# Patient Record
Sex: FEMALE | Race: WHITE | NOT HISPANIC OR LATINO | Employment: UNEMPLOYED | URBAN - METROPOLITAN AREA
[De-identification: names, ages, dates, MRNs, and addresses within clinical notes are randomized per-mention and may not be internally consistent; named-entity substitution may affect disease eponyms.]

---
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
## 2025-01-23 ENCOUNTER — EVALUATION (OUTPATIENT)
Dept: PHYSICAL THERAPY | Facility: MEDICAL CENTER | Age: 20
End: 2025-01-23
Payer: COMMERCIAL

## 2025-01-23 DIAGNOSIS — M25.552 LEFT HIP PAIN: Primary | ICD-10-CM

## 2025-01-23 PROCEDURE — 97112 NEUROMUSCULAR REEDUCATION: CPT

## 2025-01-23 PROCEDURE — 97161 PT EVAL LOW COMPLEX 20 MIN: CPT

## 2025-01-23 NOTE — PROGRESS NOTES
PT Evaluation     Today's date: 2025  Patient name: Roberta Ceja  : 2005  MRN: [id number]  Referring provider: Evelyne Millan MD  Dx:   Encounter Diagnosis     ICD-10-CM    1. Left hip pain  M25.552 Ambulatory Referral to Physical Therapy                     Assessment  Impairments: abnormal muscle firing, abnormal muscle tone, abnormal or restricted ROM, abnormal movement, activity intolerance, impaired physical strength, lacks appropriate home exercise program and pain with function    Assessment details: Roberta Ceja  is a pleasant 19 y.o. female who presents with left hip pain.  The primary movement problem is poor muscular endurance resulting in poor motor coordination and strength deficit, which limit her ability to perform ADLs, IADLs and recreational activity.  No referral is necessary at this time based on examination results.   The patient's greatest concerns is not being able to stay active.     Problem List:  1) poor muscular endurance  2) poor motor coordination  3) strength deficit     Etiologic factors include nonuse.  Pt. will benefit from skilled PT services that includes manual therapy techniques to enhance tissue extensibility, neuromuscular re-education to facilitate motor control, therapeutic exercise to increase functional mobility, and modalities prn to reduce pain and inflammation.  Understanding of Dx/Px/POC: good     Prognosis: good  Prognosis details: Positive prognostic indicators: motivation to improve   Negative prognostic indicators: chronicity of symptoms    Goals  Impairment Goals  - Pt I with initial HEP in 1-2 visits  - Improve ROM equal to contralateral side in 4-6 weeks  - Increase strength to 5/5 in all affected areas in 4-6 weeks    Functional Goals  - Increase Functional Status Measure to: 65 in 6-8 weeks  - Patient will be independent with comprehensive HEP in 6-8 weeks  - Ambulation is improved to prior level of function in 6-8 weeks  - Stair climbing  is improved to prior level of function in 6-8 weeks  - Squatting is improved to prior level of function in 6-8 weeks      Plan  Patient would benefit from: skilled physical therapy  Planned modality interventions: low level laser therapy, TENS and cryotherapy    Planned therapy interventions: joint mobilization, manual therapy, massage, neuromuscular re-education, patient education, postural training, strengthening, stretching, therapeutic activities, therapeutic exercise, flexibility, functional ROM exercises, graded exercise, home exercise program, IASTM, kinesiology taping, Kline taping, balance and balance/weight bearing training    Treatment plan discussed with: patient  Plan details: Prognosis above is given PT services 2x/week tapering to 1x/week over the next 2 months and home program adherence.        Subjective Evaluation    History of Present Illness  Mechanism of injury: Roberta Ceja presents with c/c of L hip pain. Symptoms began September 2024 with mechanism of injury: insidious onset. Worse when she is on her period. Pt reports that it stays for about a week then it goes away. Pt reports that she will get a shooting pain into her knee everyone in awhile.   Aggravating factors: walking, standing  Relieving factors: laying down  24hr pain pattern: 0/10 (current), 0/10 (best), 7/10 (worst), location: lateral hip, descriptors: tightness/throbbing  Imaging: x-ray  Previous treatments: none  Occupation/recreation: , [employer] student   Primary concern: not knowing what's wrong  Patient goals: figure out what's going on, feel better          Objective     Palpation     Additional Palpation Details  No TTP    Tenderness     Additional Tenderness Details  No TTP    Active Range of Motion   Left Hip   Normal active range of motion    Right Hip   Normal active range of motion    Passive Range of Motion     Right Hip   Normal passive range of motion    Joint Play   Left Hip   Joints within functional  limits are the posterior hip capsule, anterior hip capsule, lateral hip capsule and long axis distraction.     Right Hip   Joints within functional limits are the posterior hip capsule, anterior hip capsule, long axis distraction and long axis distraction.     Strength/Myotome Testing     Left Hip   Planes of Motion   Flexion: 3+  Extension: 3+  Abduction: 3+  Adduction: 3+    Isolated Muscles   Gluteus poli: 3+  Gluteus medius: 3+    Right Hip   Planes of Motion   Flexion: 4-  Extension: 4-  Abduction: 4-  Adduction: 4-    Isolated Muscles   Gluteus maximums: 3+  Gluteus medius: 3+    Tests     Left Hip   Negative DALE, FADIR, long sit and Mauro.     Right Hip   Negative DALE, FADIR and long sit.              Precautions: anxiety    HEP: Access Code: [email or web address]  URL: [email or web address]  Date: 01/23/2025  Prepared by: Josy Fried-Nuria    Exercises  - Supine Active Straight Leg Raise  - 1 x daily - 3-4 x weekly - 2-3 sets - 10 reps  - Sidelying Hip Abduction  - 1 x daily - 3-4 x weekly - 2-3 sets - 10 reps  - Prone Hip Extension  - 1 x daily - 3-4 x weekly - 2-3 sets - 10 reps  - Sidelying Hip Adduction  - 1 x daily - 3-4 x weekly - 2-3 sets - 10 reps  - Supine Bridge  - 1 x daily - 3-4 x weekly - 2-3 sets - 10 reps  Manuals 1/23                                                                Neuro Re-Ed             Side steps              Monster walks                                                                 HEP review and pt education 25'            Ther Ex             bike             4 way SLR             bridges             clamshells             Reverse clamshells             Hip abd/ext                                                                              Ther Activity                                       Gait Training                                       Modalities

## 2025-01-31 ENCOUNTER — OFFICE VISIT (OUTPATIENT)
Dept: PHYSICAL THERAPY | Facility: MEDICAL CENTER | Age: 20
End: 2025-01-31
Payer: COMMERCIAL

## 2025-01-31 DIAGNOSIS — M25.552 LEFT HIP PAIN: Primary | ICD-10-CM

## 2025-01-31 PROCEDURE — 97110 THERAPEUTIC EXERCISES: CPT

## 2025-01-31 NOTE — PROGRESS NOTES
"Daily Note     Today's date: 2025  Patient name: Roberta Ceja  : 2005  MRN: [id number]  Referring provider: Evelyne Millan MD  Dx:   Encounter Diagnosis     ICD-10-CM    1. Left hip pain  M25.552                      Subjective: Pt reports that she feels fine.       Objective: See treatment diary below      Assessment: POC initiated. No adverse effects noted. Tolerated treatment well. Patient demonstrated fatigue post treatment, exhibited good technique with therapeutic exercises, and would benefit from continued PT      Plan: Continue per plan of care.      Precautions: anxiety    HEP:   Access Code: [email or web address]  URL: [email or web address]  Date: 2025  Prepared by: Josy Hui    Exercises  - Supine Active Straight Leg Raise  - 1 x daily - 3-4 x weekly - 2-3 sets - 10 reps  - Sidelying Hip Abduction  - 1 x daily - 3-4 x weekly - 2-3 sets - 10 reps  - Prone Hip Extension  - 1 x daily - 3-4 x weekly - 2-3 sets - 10 reps  - Sidelying Hip Adduction  - 1 x daily - 3-4 x weekly - 2-3 sets - 10 reps  - Supine Bridge  - 1 x daily - 3-4 x weekly - 2-3 sets - 10 reps  - Clamshell with Resistance  - 1 x daily - 7 x weekly - 3 sets - 10 reps  - Sidelying Reverse Clamshell  - 1 x daily - 7 x weekly - 3 sets - 10 reps  Manuals                                                                 Neuro Re-Ed             Side steps              Monster walks             Mini squats  2x15 10# to 36\" box                                                   HEP review and pt education             Ther Ex             bike 5'            4 way SLR 2x10            bridges 2x10  hip add  2x10 hip abd gtb            clamshells 2x10 rtb            Reverse clamshells 2x10            Hip abd/ext 2x10 rtb            LP 2x15 SL 30#                                                                Ther Activity                                       Gait Training                                     "   Modalities

## 2025-02-05 ENCOUNTER — APPOINTMENT (OUTPATIENT)
Dept: PHYSICAL THERAPY | Facility: MEDICAL CENTER | Age: 20
End: 2025-02-05
Payer: COMMERCIAL

## 2025-02-12 ENCOUNTER — OFFICE VISIT (OUTPATIENT)
Dept: PHYSICAL THERAPY | Facility: MEDICAL CENTER | Age: 20
End: 2025-02-12
Payer: COMMERCIAL

## 2025-02-12 DIAGNOSIS — M25.552 LEFT HIP PAIN: Primary | ICD-10-CM

## 2025-02-12 PROCEDURE — 97110 THERAPEUTIC EXERCISES: CPT

## 2025-02-12 NOTE — PROGRESS NOTES
"Daily Note     Today's date: 2025  Patient name: Roberta Ceja  : 2005  MRN: [id number]  Referring provider: Evelyne Millan MD  Dx:   Encounter Diagnosis     ICD-10-CM    1. Left hip pain  M25.552                      Subjective: Pt reports that she did not have any pain standing too long.       Objective: See treatment diary below      Assessment: Continued with Poc. Tolerated treatment well. Patient demonstrated fatigue post treatment, exhibited good technique with therapeutic exercises, and would benefit from continued PT      Plan: Continue per plan of care.      Precautions: anxiety    HEP:   Access Code: [email or web address]  URL: [email or web address]  Date: 2025  Prepared by: Josy Hui    Exercises  - Supine Active Straight Leg Raise  - 1 x daily - 3-4 x weekly - 2-3 sets - 10 reps  - Sidelying Hip Abduction  - 1 x daily - 3-4 x weekly - 2-3 sets - 10 reps  - Prone Hip Extension  - 1 x daily - 3-4 x weekly - 2-3 sets - 10 reps  - Sidelying Hip Adduction  - 1 x daily - 3-4 x weekly - 2-3 sets - 10 reps  - Supine Bridge  - 1 x daily - 3-4 x weekly - 2-3 sets - 10 reps  - Clamshell with Resistance  - 1 x daily - 7 x weekly - 3 sets - 10 reps  - Sidelying Reverse Clamshell  - 1 x daily - 7 x weekly - 3 sets - 10 reps  Manuals 2/12                                                                Neuro Re-Ed             Side steps              Monster walks             Mini squats  2x15 10# to 36\" box            Lateral tap down 6\" 2x10                                      HEP review and pt education             Ther Ex             bike 5' R3            4 way SLR 2x10            bridges 2x10  hip add  2x10 hip abd gtb            clamshells 2x10 rtb            Reverse clamshells 2x10            Hip abd/ext 2x10 rtb            LP 2x15 SL 30#                                                                Ther Activity                                       Gait Training               "                         Modalities

## 2025-02-26 ENCOUNTER — APPOINTMENT (OUTPATIENT)
Dept: PHYSICAL THERAPY | Facility: MEDICAL CENTER | Age: 20
End: 2025-02-26
Payer: COMMERCIAL

## 2025-03-05 ENCOUNTER — OFFICE VISIT (OUTPATIENT)
Dept: PHYSICAL THERAPY | Facility: MEDICAL CENTER | Age: 20
End: 2025-03-05
Payer: COMMERCIAL

## 2025-03-05 DIAGNOSIS — M25.552 LEFT HIP PAIN: Primary | ICD-10-CM

## 2025-03-05 PROCEDURE — 97110 THERAPEUTIC EXERCISES: CPT

## 2025-03-05 NOTE — PROGRESS NOTES
"Daily Note     Today's date: 3/5/2025  Patient name: Roberta Ceja  : 2005  MRN: [id number]  Referring provider: Evelyne Millan MD  Dx:   Encounter Diagnosis     ICD-10-CM    1. Left hip pain  M25.552                      Subjective: Pt reports she feels good. Reports no issues since last visit.      Objective: See treatment diary below      Assessment: Roberta eCja has been compliant with attending PT and home exercise program since initial eval.  Roberta  has made improvements in objective data since initial evalulation and has achieved all goals.  Patient reports having returned to their prior level or function. Patient provided with updated Home Exercise Program, all questions answered, verbalized understanding and agreement to plan of care. Thus it was mutually decided to discontinue this episode of care and transition to Home Exercise Program.      Plan: DC to HEP     Precautions: anxiety    HEP:   Access Code: [email or web address]  URL: [email or web address]  Date: 2025  Prepared by: Josy Hui    Exercises  - Supine Active Straight Leg Raise  - 1 x daily - 3-4 x weekly - 2-3 sets - 10 reps  - Sidelying Hip Abduction  - 1 x daily - 3-4 x weekly - 2-3 sets - 10 reps  - Prone Hip Extension  - 1 x daily - 3-4 x weekly - 2-3 sets - 10 reps  - Sidelying Hip Adduction  - 1 x daily - 3-4 x weekly - 2-3 sets - 10 reps  - Supine Bridge  - 1 x daily - 3-4 x weekly - 2-3 sets - 10 reps  - Clamshell with Resistance  - 1 x daily - 7 x weekly - 3 sets - 10 reps  - Sidelying Reverse Clamshell  - 1 x daily - 7 x weekly - 3 sets - 10 reps  Manuals 3/5                                                                Neuro Re-Ed             Side steps  3 laps gtb            Monster walks             Mini squats  2x15 10# to 36\" box            Lateral tap down 6\" 2x10                                      HEP review and pt education             Ther Ex             bike 10' R3            4 way SLR " 2x10            bridges 2x10  hip add  2x10 hip abd gtb            clamshells 2x10 rtb            Reverse clamshells 2x10            Hip abd/ext 2x10 rtb            LP 2x15 SL 30#                                                                Ther Activity                                       Gait Training                                       Modalities

## 2025-06-26 ENCOUNTER — OFFICE VISIT (OUTPATIENT)
Dept: FAMILY MEDICINE CLINIC | Facility: CLINIC | Age: 20
End: 2025-06-26
Payer: COMMERCIAL

## 2025-06-26 VITALS
WEIGHT: 110 LBS | TEMPERATURE: 97.5 F | HEART RATE: 104 BPM | HEIGHT: 65 IN | SYSTOLIC BLOOD PRESSURE: 110 MMHG | OXYGEN SATURATION: 98 % | BODY MASS INDEX: 18.33 KG/M2 | DIASTOLIC BLOOD PRESSURE: 70 MMHG

## 2025-06-26 DIAGNOSIS — R79.89 ABNORMAL TSH: ICD-10-CM

## 2025-06-26 DIAGNOSIS — F43.22 ADJUSTMENT DISORDER WITH ANXIOUS MOOD: ICD-10-CM

## 2025-06-26 DIAGNOSIS — F32.2 SEVERE MAJOR DEPRESSIVE DISORDER (HCC): ICD-10-CM

## 2025-06-26 DIAGNOSIS — F42.9 OBSESSIVE-COMPULSIVE DISORDER, UNSPECIFIED TYPE: ICD-10-CM

## 2025-06-26 DIAGNOSIS — Z00.00 ANNUAL PHYSICAL EXAM: Primary | ICD-10-CM

## 2025-06-26 DIAGNOSIS — E55.9 VITAMIN D DEFICIENCY: ICD-10-CM

## 2025-06-26 DIAGNOSIS — F41.9 ANXIETY: ICD-10-CM

## 2025-06-26 DIAGNOSIS — R63.6 UNDERWEIGHT: ICD-10-CM

## 2025-06-26 DIAGNOSIS — Z13.6 SCREENING FOR CARDIOVASCULAR CONDITION: ICD-10-CM

## 2025-06-26 DIAGNOSIS — Z86.2 HISTORY OF ANEMIA: ICD-10-CM

## 2025-06-26 DIAGNOSIS — F32.9 MAJOR DEPRESSIVE DISORDER, REMISSION STATUS UNSPECIFIED, UNSPECIFIED WHETHER RECURRENT: ICD-10-CM

## 2025-06-26 PROCEDURE — 99214 OFFICE O/P EST MOD 30 MIN: CPT | Performed by: FAMILY MEDICINE

## 2025-06-26 PROCEDURE — 99395 PREV VISIT EST AGE 18-39: CPT | Performed by: FAMILY MEDICINE

## 2025-06-26 RX ORDER — FLUOXETINE 10 MG/1
10 CAPSULE ORAL DAILY
Qty: 30 CAPSULE | Refills: 1 | Status: SHIPPED | OUTPATIENT
Start: 2025-06-26

## 2025-06-26 NOTE — PROGRESS NOTES
Answers submitted by the patient for this visit:  Annual Physical (Submitted on 2025)  Diet/Nutrition choices: no special diet  Exercise choices: walking  Exercise additional comments: walks my dog  Sleep choices: sleeps poorly  Hearing choices: normal hearing bilateral ears  Vision choices: wears glasses  Dental choices: regular dental visits, brushes teeth once daily, does not floss  Do you currently have an OB/GYN provider that you routinely follow with?: No  Menopausal status: premenopausal  Last menstrual cycle (if applicable):: 2025  Any history of sexual transmitted disease/infection?: No  Do you have an advance directive/living will?: No  Do you have a durable power of  (POA)?: No  Name: Roberta Ceja      : 2005      MRN: [id number]  Encounter Provider: Evelyne Millan MD  Encounter Date: 2025   Encounter department: [facility]  :  Assessment & Plan  Annual physical exam  Health maintenance reviewed  Labs ordered  Orders:    Lipid Panel with Direct LDL reflex; Future    CBC and differential; Future    Comprehensive metabolic panel; Future    TSH, 3rd generation with Free T4 reflex; Future    Vitamin D 25 hydroxy; Future    Abnormal TSH  Repeat TSH  Orders:    Lipid Panel with Direct LDL reflex; Future    CBC and differential; Future    Comprehensive metabolic panel; Future    TSH, 3rd generation with Free T4 reflex; Future    Vitamin D 25 hydroxy; Future    Major depressive disorder, remission status unspecified, unspecified whether recurrent  Depression Screening Follow-up Plan: Patient's depression screening was positive with a PHQ-9 score of 22. Patient assessed for underlying major depression. They have no active suicidal ideations. Brief counseling provided and recommend additional follow-up/re-evaluation next office visit. Start medication    Orders:    Lipid Panel with Direct LDL reflex; Future    CBC and differential; Future    Comprehensive  metabolic panel; Future    TSH, 3rd generation with Free T4 reflex; Future    Vitamin D 25 hydroxy; Future    Anxiety  Start Prozac  Counseling recommended  Orders:    Lipid Panel with Direct LDL reflex; Future    CBC and differential; Future    Comprehensive metabolic panel; Future    TSH, 3rd generation with Free T4 reflex; Future    Vitamin D 25 hydroxy; Future    FLUoxetine (PROzac) 10 mg capsule; Take 1 capsule (10 mg total) by mouth daily    Adjustment disorder with anxious mood  Start medication  Counseling recommended  Orders:    Lipid Panel with Direct LDL reflex; Future    CBC and differential; Future    Comprehensive metabolic panel; Future    TSH, 3rd generation with Free T4 reflex; Future    Vitamin D 25 hydroxy; Future    FLUoxetine (PROzac) 10 mg capsule; Take 1 capsule (10 mg total) by mouth daily    Screening for cardiovascular condition    Orders:    Lipid Panel with Direct LDL reflex; Future    CBC and differential; Future    Comprehensive metabolic panel; Future    TSH, 3rd generation with Free T4 reflex; Future    Vitamin D 25 hydroxy; Future    Vitamin D deficiency    Orders:    Lipid Panel with Direct LDL reflex; Future    CBC and differential; Future    Comprehensive metabolic panel; Future    TSH, 3rd generation with Free T4 reflex; Future    Vitamin D 25 hydroxy; Future    Underweight  Discussed diet and increasing caloric intake  Orders:    Lipid Panel with Direct LDL reflex; Future    CBC and differential; Future    Comprehensive metabolic panel; Future    TSH, 3rd generation with Free T4 reflex; Future    Vitamin D 25 hydroxy; Future    Iron Panel (Includes Ferritin, Iron Sat%, Iron, and TIBC); Future    History of anemia  Recheck iron panel  Orders:    Iron Panel (Includes Ferritin, Iron Sat%, Iron, and TIBC); Future    Severe major depressive disorder (HCC)      Orders:    FLUoxetine (PROzac) 10 mg capsule; Take 1 capsule (10 mg total) by mouth daily    Obsessive-compulsive disorder,  unspecified type    Orders:    FLUoxetine (PROzac) 10 mg capsule; Take 1 capsule (10 mg total) by mouth daily          Depression Screening and Follow-up Plan:   Yes      History of Present Illness   HPI  20yof here for well exam  Pt is concerned about depression and anxiety, OCD  Has been on meds in the past, is interested in getting back on meds  Pt denies thoughts of hurting self  Pt is interested in getting into counseling which I highly recommended  I did give her handout on counselors in the area, also recommended to call the mental health line on back on insurance card\  Pt feels her OCD is what triggers her anxiety  Pt also feels like she has some ADHD- has been on Tenex and Concerta in the past  Discussed not starting more than 1 med at a time to see reaction and side effects  Discussed starting with Prozac since pt feels OCD is what triggers most of her symptoms  Also discussed getting psych consult possibly for evaluation  Pt and mom are in agreement with this plan  Pt is currently in school, denies smoking, drugs  History of anemia, will check iron panel  Health maintenance reviewed  Denies bowel or bladder issues  Decline vaccines        PHQ-2/9 Depression Screening    Little interest or pleasure in doing things: 3 - nearly every day  Feeling down, depressed, or hopeless: 3 - nearly every day  Trouble falling or staying asleep, or sleeping too much: 3 - nearly every day  Feeling tired or having little energy: 3 - nearly every day  Poor appetite or overeating: 3 - nearly every day  Feeling bad about yourself - or that you are a failure or have let yourself or your family down: 3 - nearly every day  Trouble concentrating on things, such as reading the newspaper or watching television: 3 - nearly every day  Moving or speaking so slowly that other people could have noticed. Or the opposite - being so fidgety or restless that you have been moving around a lot more than usual: 0 - not at all  Thoughts that  "you would be better off dead, or of hurting yourself in some way: 1 - several days  PHQ-9 Score: 22  PHQ-9 Interpretation: Severe depression         Review of Systems   Constitutional: Negative.  Negative for chills and fever.   HENT: Negative.  Negative for ear pain and sore throat.    Eyes:  Negative for pain and visual disturbance.   Respiratory: Negative.  Negative for cough and shortness of breath.    Cardiovascular: Negative.  Negative for chest pain and palpitations.   Gastrointestinal: Negative.  Negative for abdominal pain and vomiting.   Genitourinary: Negative.  Negative for dysuria and hematuria.   Musculoskeletal:  Negative for arthralgias and back pain.   Skin:  Negative for color change and rash.   Neurological: Negative.  Negative for seizures and syncope.   Psychiatric/Behavioral:  Positive for decreased concentration and dysphoric mood. The patient is nervous/anxious.    All other systems reviewed and are negative.      Objective   /70 (BP Location: Left arm, Patient Position: Sitting, Cuff Size: Adult)   Pulse 104   Temp 97.5 °F (36.4 °C) (Temporal)   Ht 5' 5\" (1.651 m)   Wt 49.9 kg (110 lb)   LMP 06/14/2025   SpO2 98%   BMI 18.30 kg/m²      Physical Exam  Vitals and nursing note reviewed.   Constitutional:       General: She is not in acute distress.     Appearance: Normal appearance. She is well-developed.   HENT:      Head: Normocephalic and atraumatic.      Right Ear: Tympanic membrane normal.      Left Ear: Tympanic membrane normal.      Nose: Nose normal.      Mouth/Throat:      Mouth: Mucous membranes are moist.     Eyes:      Conjunctiva/sclera: Conjunctivae normal.      Pupils: Pupils are equal, round, and reactive to light.       Cardiovascular:      Rate and Rhythm: Normal rate and regular rhythm.      Pulses: Normal pulses.      Heart sounds: Normal heart sounds. No murmur heard.  Pulmonary:      Effort: Pulmonary effort is normal. No respiratory distress.      Breath " sounds: Normal breath sounds.   Abdominal:      Palpations: Abdomen is soft.      Tenderness: There is no abdominal tenderness.     Musculoskeletal:         General: Swelling present.      Cervical back: Neck supple.     Skin:     General: Skin is warm and dry.      Capillary Refill: Capillary refill takes less than 2 seconds.     Neurological:      General: No focal deficit present.      Mental Status: She is alert and oriented to person, place, and time.     Psychiatric:         Mood and Affect: Mood normal.         Behavior: Behavior normal.       Administrative Statements   I have spent a total time of 30 minutes in caring for this patient on the day of the visit/encounter including Prognosis, Risks and benefits of tx options, Instructions for management, Patient and family education, Importance of tx compliance, Risk factor reductions, Impressions, Counseling / Coordination of care, Documenting in the medical record, and Obtaining or reviewing history  .

## 2025-06-26 NOTE — PATIENT INSTRUCTIONS
"Patient Education     Routine physical for adults   The Basics   Written by the doctors and editors at [facility]   What is a physical? -- A physical is a routine visit, or \"check-up,\" with your doctor. You might also hear it called a \"wellness visit\" or \"preventive visit.\"  During each visit, the doctor will:   Ask about your physical and mental health   Ask about your habits, behaviors, and lifestyle   Do an exam   Give you vaccines if needed   Talk to you about any medicines you take   Give advice about your health   Answer your questions  Getting regular check-ups is an important part of taking care of your health. It can help your doctor find and treat any problems you have. But it's also important for preventing health problems.  A routine physical is different from a \"sick visit.\" A sick visit is when you see a doctor because of a health concern or problem. Since physicals are scheduled ahead of time, you can think about what you want to ask the doctor.  How often should I get a physical? -- It depends on your age and health. In general, for people age 21 years and older:   If you are younger than 50 years, you might be able to get a physical every 3 years.   If you are 50 years or older, your doctor might recommend a physical every year.  If you have an ongoing health condition, like diabetes or high blood pressure, your doctor will probably want to see you more often.  What happens during a physical? -- In general, each visit will include:   Physical exam - The doctor or nurse will check your height, weight, heart rate, and blood pressure. They will also look at your eyes and ears. They will ask about how you are feeling and whether you have any symptoms that bother you.   Medicines - It's a good idea to bring a list of all the medicines you take to each doctor visit. Your doctor will talk to you about your medicines and answer any questions. Tell them if you are having any side effects that bother you. You " "should also tell them if you are having trouble paying for any of your medicines.   Habits and behaviors - This includes:   Your diet   Your exercise habits   Whether you smoke, drink alcohol, or use drugs   Whether you are sexually active   Whether you feel safe at home  Your doctor will talk to you about things you can do to improve your health and lower your risk of health problems. They will also offer help and support. For example, if you want to quit smoking, they can give you advice and might prescribe medicines. If you want to improve your diet or get more physical activity, they can help you with this, too.   Lab tests, if needed - The tests you get will depend on your age and situation. For example, your doctor might want to check your:   Cholesterol   Blood sugar   Iron level   Vaccines - The recommended vaccines will depend on your age, health, and what vaccines you already had. Vaccines are very important because they can prevent certain serious or deadly infections.   Discussion of screening - \"Screening\" means checking for diseases or other health problems before they cause symptoms. Your doctor can recommend screening based on your age, risk, and preferences. This might include tests to check for:   Cancer, such as breast, prostate, cervical, ovarian, colorectal, prostate, lung, or skin cancer   Sexually transmitted infections, such as chlamydia and gonorrhea   Mental health conditions like depression and anxiety  Your doctor will talk to you about the different types of screening tests. They can help you decide which screenings to have. They can also explain what the results might mean.   Answering questions - The physical is a good time to ask the doctor or nurse questions about your health. If needed, they can refer you to other doctors or specialists, too.  Adults older than 65 years often need other care, too. As you get older, your doctor will talk to you about:   How to prevent falling at " home   Hearing or vision tests   Memory testing   How to take your medicines safely   Making sure that you have the help and support you need at home  All topics are updated as new evidence becomes available and our peer review process is complete.  This topic retrieved from [employer] on: May 02, 2024.  Topic 093778 Version 1.0  Release: 32.4.3 - C32.122  © 2024 UpToDate, Inc. and/or its affiliates. All rights reserved.  Consumer Information Use and Disclaimer   Disclaimer: This generalized information is a limited summary of diagnosis, treatment, and/or medication information. It is not meant to be comprehensive and should be used as a tool to help the user understand and/or assess potential diagnostic and treatment options. It does NOT include all information about conditions, treatments, medications, side effects, or risks that may apply to a specific patient. It is not intended to be medical advice or a substitute for the medical advice, diagnosis, or treatment of a health care provider based on the health care provider's examination and assessment of a patient's specific and unique circumstances. Patients must speak with a health care provider for complete information about their health, medical questions, and treatment options, including any risks or benefits regarding use of medications. This information does not endorse any treatments or medications as safe, effective, or approved for treating a specific patient. UpToDate, Inc. and its affiliates disclaim any warranty or liability relating to this information or the use thereof.The use of this information is governed by the Terms of Use, available at https://www.woltersMaterialiseuwer.com/en/know/clinical-effectiveness-terms. 2024© UpToDate, Inc. and its affiliates and/or licensors. All rights reserved.  Copyright   © 2024 Up[employer]Date, Inc. and/or its affiliates. All rights reserved.

## 2025-06-30 NOTE — ASSESSMENT & PLAN NOTE
Depression Screening Follow-up Plan: Patient's depression screening was positive with a PHQ-9 score of 22. Patient assessed for underlying major depression. They have no active suicidal ideations. Brief counseling provided and recommend additional follow-up/re-evaluation next office visit. Start medication    Orders:    Lipid Panel with Direct LDL reflex; Future    CBC and differential; Future    Comprehensive metabolic panel; Future    TSH, 3rd generation with Free T4 reflex; Future    Vitamin D 25 hydroxy; Future

## 2025-06-30 NOTE — ASSESSMENT & PLAN NOTE
Start medication  Counseling recommended  Orders:    Lipid Panel with Direct LDL reflex; Future    CBC and differential; Future    Comprehensive metabolic panel; Future    TSH, 3rd generation with Free T4 reflex; Future    Vitamin D 25 hydroxy; Future    FLUoxetine (PROzac) 10 mg capsule; Take 1 capsule (10 mg total) by mouth daily

## 2025-06-30 NOTE — ASSESSMENT & PLAN NOTE
Start Prozac  Counseling recommended  Orders:    Lipid Panel with Direct LDL reflex; Future    CBC and differential; Future    Comprehensive metabolic panel; Future    TSH, 3rd generation with Free T4 reflex; Future    Vitamin D 25 hydroxy; Future    FLUoxetine (PROzac) 10 mg capsule; Take 1 capsule (10 mg total) by mouth daily

## 2025-07-17 ENCOUNTER — APPOINTMENT (OUTPATIENT)
Dept: LAB | Facility: MEDICAL CENTER | Age: 20
End: 2025-07-17
Payer: COMMERCIAL

## 2025-07-17 DIAGNOSIS — R63.6 UNDERWEIGHT: ICD-10-CM

## 2025-07-17 DIAGNOSIS — F32.2 SEVERE MAJOR DEPRESSIVE DISORDER (HCC): ICD-10-CM

## 2025-07-17 DIAGNOSIS — F41.9 ANXIETY: ICD-10-CM

## 2025-07-17 DIAGNOSIS — R79.89 ABNORMAL TSH: ICD-10-CM

## 2025-07-17 DIAGNOSIS — F32.9 MAJOR DEPRESSIVE DISORDER, REMISSION STATUS UNSPECIFIED, UNSPECIFIED WHETHER RECURRENT: ICD-10-CM

## 2025-07-17 DIAGNOSIS — F42.9 OBSESSIVE-COMPULSIVE DISORDER, UNSPECIFIED TYPE: ICD-10-CM

## 2025-07-17 DIAGNOSIS — Z86.2 HISTORY OF ANEMIA: ICD-10-CM

## 2025-07-17 DIAGNOSIS — F43.22 ADJUSTMENT DISORDER WITH ANXIOUS MOOD: ICD-10-CM

## 2025-07-17 DIAGNOSIS — E55.9 VITAMIN D DEFICIENCY: ICD-10-CM

## 2025-07-17 DIAGNOSIS — Z00.00 ANNUAL PHYSICAL EXAM: ICD-10-CM

## 2025-07-17 DIAGNOSIS — Z13.6 SCREENING FOR CARDIOVASCULAR CONDITION: ICD-10-CM

## 2025-07-17 LAB
25(OH)D3 SERPL-MCNC: 25.3 NG/ML (ref 30–100)
ALBUMIN SERPL BCG-MCNC: 4.3 G/DL (ref 3.5–5)
ALP SERPL-CCNC: 46 U/L (ref 34–104)
ALT SERPL W P-5'-P-CCNC: 13 U/L (ref 7–52)
ANION GAP SERPL CALCULATED.3IONS-SCNC: 8 MMOL/L (ref 4–13)
AST SERPL W P-5'-P-CCNC: 20 U/L (ref 13–39)
BASOPHILS # BLD AUTO: 0.06 THOUSANDS/ÂΜL (ref 0–0.1)
BASOPHILS NFR BLD AUTO: 1 % (ref 0–1)
BILIRUB SERPL-MCNC: 0.41 MG/DL (ref 0.2–1)
BUN SERPL-MCNC: 11 MG/DL (ref 5–25)
CALCIUM SERPL-MCNC: 9.4 MG/DL (ref 8.4–10.2)
CHLORIDE SERPL-SCNC: 103 MMOL/L (ref 96–108)
CHOLEST SERPL-MCNC: 169 MG/DL (ref ?–200)
CO2 SERPL-SCNC: 26 MMOL/L (ref 21–32)
CREAT SERPL-MCNC: 0.78 MG/DL (ref 0.6–1.3)
EOSINOPHIL # BLD AUTO: 0.11 THOUSAND/ÂΜL (ref 0–0.61)
EOSINOPHIL NFR BLD AUTO: 2 % (ref 0–6)
ERYTHROCYTE [DISTWIDTH] IN BLOOD BY AUTOMATED COUNT: 11.9 % (ref 11.6–15.1)
FERRITIN SERPL-MCNC: 7 NG/ML (ref 30–307)
GFR SERPL CREATININE-BSD FRML MDRD: 109 ML/MIN/1.73SQ M
GLUCOSE P FAST SERPL-MCNC: 94 MG/DL (ref 65–99)
HCT VFR BLD AUTO: 41.7 % (ref 34.8–46.1)
HDLC SERPL-MCNC: 66 MG/DL
HGB BLD-MCNC: 14.2 G/DL (ref 11.5–15.4)
IMM GRANULOCYTES # BLD AUTO: 0.02 THOUSAND/UL (ref 0–0.2)
IMM GRANULOCYTES NFR BLD AUTO: 0 % (ref 0–2)
IRON SATN MFR SERPL: 27 % (ref 15–50)
IRON SERPL-MCNC: 101 UG/DL (ref 50–212)
LDLC SERPL CALC-MCNC: 90 MG/DL (ref 0–100)
LYMPHOCYTES # BLD AUTO: 1.94 THOUSANDS/ÂΜL (ref 0.6–4.47)
LYMPHOCYTES NFR BLD AUTO: 34 % (ref 14–44)
MCH RBC QN AUTO: 30.4 PG (ref 26.8–34.3)
MCHC RBC AUTO-ENTMCNC: 34.1 G/DL (ref 31.4–37.4)
MCV RBC AUTO: 89 FL (ref 82–98)
MONOCYTES # BLD AUTO: 0.42 THOUSAND/ÂΜL (ref 0.17–1.22)
MONOCYTES NFR BLD AUTO: 7 % (ref 4–12)
NEUTROPHILS # BLD AUTO: 3.12 THOUSANDS/ÂΜL (ref 1.85–7.62)
NEUTS SEG NFR BLD AUTO: 56 % (ref 43–75)
NRBC BLD AUTO-RTO: 0 /100 WBCS
PLATELET # BLD AUTO: 237 THOUSANDS/UL (ref 149–390)
PMV BLD AUTO: 10.7 FL (ref 8.9–12.7)
POTASSIUM SERPL-SCNC: 4.1 MMOL/L (ref 3.5–5.3)
PROT SERPL-MCNC: 7 G/DL (ref 6.4–8.4)
RBC # BLD AUTO: 4.67 MILLION/UL (ref 3.81–5.12)
SODIUM SERPL-SCNC: 137 MMOL/L (ref 135–147)
TIBC SERPL-MCNC: 380.8 UG/DL (ref 250–450)
TRANSFERRIN SERPL-MCNC: 272 MG/DL (ref 203–362)
TRIGL SERPL-MCNC: 66 MG/DL (ref ?–150)
TSH SERPL DL<=0.05 MIU/L-ACNC: 1.22 UIU/ML (ref 0.45–4.5)
UIBC SERPL-MCNC: 280 UG/DL (ref 155–355)
WBC # BLD AUTO: 5.67 THOUSAND/UL (ref 4.31–10.16)

## 2025-07-17 PROCEDURE — 84443 ASSAY THYROID STIM HORMONE: CPT

## 2025-07-17 PROCEDURE — 80061 LIPID PANEL: CPT

## 2025-07-17 PROCEDURE — 82306 VITAMIN D 25 HYDROXY: CPT

## 2025-07-17 PROCEDURE — 83550 IRON BINDING TEST: CPT

## 2025-07-17 PROCEDURE — 83540 ASSAY OF IRON: CPT

## 2025-07-17 PROCEDURE — 80053 COMPREHEN METABOLIC PANEL: CPT

## 2025-07-17 PROCEDURE — 82728 ASSAY OF FERRITIN: CPT

## 2025-07-17 PROCEDURE — 36415 COLL VENOUS BLD VENIPUNCTURE: CPT

## 2025-07-17 PROCEDURE — 85025 COMPLETE CBC W/AUTO DIFF WBC: CPT

## 2025-07-18 RX ORDER — FLUOXETINE 10 MG/1
10 CAPSULE ORAL DAILY
Qty: 30 CAPSULE | Refills: 0 | OUTPATIENT
Start: 2025-07-18

## 2025-07-21 NOTE — TELEPHONE ENCOUNTER
Patient contacted the refill line wanting to know why the medication was denied. Patient states that she is going on vacation and needs this by the end of the week. Informed her that the pharmacy still has a refill and she would need to contact her insurance company to get an early release approval.

## 2025-07-22 DIAGNOSIS — F43.22 ADJUSTMENT DISORDER WITH ANXIOUS MOOD: ICD-10-CM

## 2025-07-22 DIAGNOSIS — F41.9 ANXIETY: ICD-10-CM

## 2025-07-22 DIAGNOSIS — F32.2 SEVERE MAJOR DEPRESSIVE DISORDER (HCC): ICD-10-CM

## 2025-07-22 DIAGNOSIS — F42.9 OBSESSIVE-COMPULSIVE DISORDER, UNSPECIFIED TYPE: ICD-10-CM

## 2025-07-22 RX ORDER — FLUOXETINE 10 MG/1
10 CAPSULE ORAL DAILY
Qty: 30 CAPSULE | Refills: 1 | Status: SHIPPED | OUTPATIENT
Start: 2025-07-22

## 2025-08-21 DIAGNOSIS — F43.22 ADJUSTMENT DISORDER WITH ANXIOUS MOOD: ICD-10-CM

## 2025-08-21 DIAGNOSIS — F41.9 ANXIETY: ICD-10-CM

## 2025-08-21 DIAGNOSIS — F42.9 OBSESSIVE-COMPULSIVE DISORDER, UNSPECIFIED TYPE: ICD-10-CM

## 2025-08-21 DIAGNOSIS — F32.2 SEVERE MAJOR DEPRESSIVE DISORDER (HCC): ICD-10-CM

## 2025-08-22 ENCOUNTER — TELEPHONE (OUTPATIENT)
Age: 20
End: 2025-08-22

## 2025-08-22 RX ORDER — FLUOXETINE 10 MG/1
10 CAPSULE ORAL DAILY
Qty: 30 CAPSULE | Refills: 0 | OUTPATIENT
Start: 2025-08-22